# Patient Record
Sex: MALE | Race: BLACK OR AFRICAN AMERICAN | URBAN - METROPOLITAN AREA
[De-identification: names, ages, dates, MRNs, and addresses within clinical notes are randomized per-mention and may not be internally consistent; named-entity substitution may affect disease eponyms.]

---

## 2018-07-03 ENCOUNTER — TELEPHONE (OUTPATIENT)
Dept: FAMILY MEDICINE CLINIC | Facility: CLINIC | Age: 33
End: 2018-07-03

## 2018-07-03 NOTE — TELEPHONE ENCOUNTER
Received signed release form.  Processed, prepared and faxed  14 pages of records to [address] [place] [address] [address] [facility] Fax [phone], Phone [phone]